# Patient Record
(demographics unavailable — no encounter records)

---

## 2024-11-11 NOTE — PHYSICAL EXAM
[de-identified] :                    Well appearing and nourished with no obvious deformities or distress.  Eyes:  No conjunctival injection and no xanthelasmas. HEENT:  Normocephalic.Normal oral mucosa. No pallor or cyanosis Neck:  No jugular venous distension. with normal A and V wave forms. No palpable adenopathy. Cardiovascular:  Normal rate and rhythm with normal S1, S2 and a grade 1/6 systolic murmur. Distal arterial pulses are normal. No significant peripheral edema. Pulmonary:  Lungs are clear to auscultation and percussion. Normal respiratory pattern without any accessory muscle use Abdomen:  Soft, non-tender ; no palpable organomegaly or masses. Extremities: No digital clubbing, cyanosis or ischemic changes. Skin:  No skin lesions, rashes, ulcers or xanthomas. Psychiatric:  Alert and oriented to person, place and time. Appropriate mood and affect.

## 2024-11-11 NOTE — REASON FOR VISIT
[FreeTextEntry1] : 38-year-old male presents here for cardiac reassessment, having previously presented here 1 year ago with concerns about palpitations and periodic heaviness on his chest.  Patient has no pre-existing history of cardiovascular disease. Denies hypertension, diabetes, hyperlipidemia. Smoked cigarettes for 10 years but stopped about 9 years ago. Father had an AVR Grandmother had an AVR  The palpitations are best described as a skipping sensation that is periodic more often nocturnal and has been going on for about 1 year.  Symptoms are not exertional and tend to be less frequent during the day.  In view of the palpitations and the family history of valvular disease, he underwent a Holter monitor and an echocardiogram.  The chest heaviness is also nonexertional and he thinks might be related to thoughts/anxious moments. Since his visit here 1 year ago, he is feeling much improved and while the palpitations have not entirely disappear they are considerably less frequent and mostly related to stress.  He works as an  and carries heavy equipment up the stairs without any exertional difficulty.  Denies any exertional chest pain, shortness of breath. There is no PND, orthopnea or edema. There is no history of syncope near syncope heart murmur He denies any ECG abnormalities in the past.

## 2024-11-11 NOTE — ASSESSMENT
[FreeTextEntry1] : ECG: Normal sinus rhythm at 81 bpm, left anterior fascicular block, no significant ST-T wave changes.  Laboratory data: ----------8/3/22----8/17/23 Chol----216---------229 HDL-----53-----------56 LDL----133----------153 Trig----162----------113  Echocardiogram 1/8/2024 Normal LV size and function LVEF 60 to 65% No significant valvular disease.  Holter monitor 11/27/2023 Heart rate average 77 Heart rate range 57-1 23 Rare PVCs  Impression - 38-year-old male with a history of palpitations, characterized as a skipping sensation and periodic chest heaviness.  There are no associated exertional symptoms. Holter monitor was entirely unremarkable.  - There is a family history of aortic valve disease with father and paternal grandmother both requiring AVR's. Echocardiogram showed no evidence of any significant valvulopathy.   -The ECG is significant for left anterior fascicular block.  - Hyperlipidemia is noted on his blood work.  Follow-up and management of this should be part of his ongoing medical care.   Plan: 1.  Reassured the patient that he has no evidence of an aortic valvulopathy as was seen in his father and grandfather.  Likelihood is this was a bicuspid inheritance pattern.  2 .  As palpitations have resolved, the echocardiogram and Holter were unremarkable, further evaluation of this is not considered necessary.  3.  Hyperlipidemia, if persistent should be addressed with diet, exercise and possibly pharmacotherapy.  Follow-up here in the future will be on an as-needed basis.